# Patient Record
Sex: MALE | Race: WHITE | Employment: FULL TIME | URBAN - METROPOLITAN AREA
[De-identification: names, ages, dates, MRNs, and addresses within clinical notes are randomized per-mention and may not be internally consistent; named-entity substitution may affect disease eponyms.]

---

## 2022-11-08 ENCOUNTER — OFFICE VISIT (OUTPATIENT)
Dept: ALLERGY | Facility: OTHER | Age: 37
End: 2022-11-08
Payer: COMMERCIAL

## 2022-11-08 VITALS — HEART RATE: 59 BPM | WEIGHT: 215.61 LBS | OXYGEN SATURATION: 97 % | HEIGHT: 68 IN | BODY MASS INDEX: 32.68 KG/M2

## 2022-11-08 DIAGNOSIS — T78.3XXA ANGIOEDEMA, INITIAL ENCOUNTER: Primary | ICD-10-CM

## 2022-11-08 LAB
ALBUMIN SERPL-MCNC: 4 G/DL (ref 3.4–5)
ALP SERPL-CCNC: 55 U/L (ref 40–150)
ALT SERPL W P-5'-P-CCNC: 35 U/L (ref 0–70)
ANION GAP SERPL CALCULATED.3IONS-SCNC: 5 MMOL/L (ref 3–14)
AST SERPL W P-5'-P-CCNC: 25 U/L (ref 0–45)
BASOPHILS # BLD AUTO: 0 10E3/UL (ref 0–0.2)
BASOPHILS NFR BLD AUTO: 1 %
BILIRUB SERPL-MCNC: 0.4 MG/DL (ref 0.2–1.3)
BUN SERPL-MCNC: 15 MG/DL (ref 7–30)
CALCIUM SERPL-MCNC: 8.5 MG/DL (ref 8.5–10.1)
CHLORIDE BLD-SCNC: 105 MMOL/L (ref 94–109)
CO2 SERPL-SCNC: 27 MMOL/L (ref 20–32)
CREAT SERPL-MCNC: 0.74 MG/DL (ref 0.66–1.25)
EOSINOPHIL # BLD AUTO: 0.1 10E3/UL (ref 0–0.7)
EOSINOPHIL NFR BLD AUTO: 3 %
ERYTHROCYTE [DISTWIDTH] IN BLOOD BY AUTOMATED COUNT: 12.4 % (ref 10–15)
GFR SERPL CREATININE-BSD FRML MDRD: >90 ML/MIN/1.73M2
GLUCOSE BLD-MCNC: 95 MG/DL (ref 70–99)
HCT VFR BLD AUTO: 44.4 % (ref 40–53)
HGB BLD-MCNC: 15.1 G/DL (ref 13.3–17.7)
LYMPHOCYTES # BLD AUTO: 1.8 10E3/UL (ref 0.8–5.3)
LYMPHOCYTES NFR BLD AUTO: 37 %
Lab: NORMAL
MCH RBC QN AUTO: 31.5 PG (ref 26.5–33)
MCHC RBC AUTO-ENTMCNC: 34 G/DL (ref 31.5–36.5)
MCV RBC AUTO: 93 FL (ref 78–100)
MONOCYTES # BLD AUTO: 0.6 10E3/UL (ref 0–1.3)
MONOCYTES NFR BLD AUTO: 11 %
NEUTROPHILS # BLD AUTO: 2.4 10E3/UL (ref 1.6–8.3)
NEUTROPHILS NFR BLD AUTO: 48 %
PERFORMING LABORATORY: NORMAL
PLATELET # BLD AUTO: 240 10E3/UL (ref 150–450)
POTASSIUM BLD-SCNC: 3.9 MMOL/L (ref 3.4–5.3)
PROT SERPL-MCNC: 7.4 G/DL (ref 6.8–8.8)
RBC # BLD AUTO: 4.8 10E6/UL (ref 4.4–5.9)
SODIUM SERPL-SCNC: 137 MMOL/L (ref 133–144)
SPECIMEN STATUS: NORMAL
T4 FREE SERPL-MCNC: 0.89 NG/DL (ref 0.76–1.46)
TEST NAME: NORMAL
TSH SERPL DL<=0.005 MIU/L-ACNC: 2.44 MU/L (ref 0.4–4)
WBC # BLD AUTO: 4.9 10E3/UL (ref 4–11)

## 2022-11-08 PROCEDURE — 88185 FLOWCYTOMETRY/TC ADD-ON: CPT | Mod: 90 | Performed by: ALLERGY & IMMUNOLOGY

## 2022-11-08 PROCEDURE — 82785 ASSAY OF IGE: CPT | Performed by: ALLERGY & IMMUNOLOGY

## 2022-11-08 PROCEDURE — 86003 ALLG SPEC IGE CRUDE XTRC EA: CPT | Performed by: ALLERGY & IMMUNOLOGY

## 2022-11-08 PROCEDURE — 99204 OFFICE O/P NEW MOD 45 MIN: CPT | Performed by: ALLERGY & IMMUNOLOGY

## 2022-11-08 PROCEDURE — 86800 THYROGLOBULIN ANTIBODY: CPT | Performed by: ALLERGY & IMMUNOLOGY

## 2022-11-08 PROCEDURE — 80050 GENERAL HEALTH PANEL: CPT | Performed by: ALLERGY & IMMUNOLOGY

## 2022-11-08 PROCEDURE — 86161 COMPLEMENT/FUNCTION ACTIVITY: CPT | Mod: 90 | Performed by: ALLERGY & IMMUNOLOGY

## 2022-11-08 PROCEDURE — 84439 ASSAY OF FREE THYROXINE: CPT | Performed by: ALLERGY & IMMUNOLOGY

## 2022-11-08 PROCEDURE — 83516 IMMUNOASSAY NONANTIBODY: CPT | Mod: 90 | Performed by: ALLERGY & IMMUNOLOGY

## 2022-11-08 PROCEDURE — 88184 FLOWCYTOMETRY/ TC 1 MARKER: CPT | Mod: 90 | Performed by: ALLERGY & IMMUNOLOGY

## 2022-11-08 PROCEDURE — 99000 SPECIMEN HANDLING OFFICE-LAB: CPT | Performed by: ALLERGY & IMMUNOLOGY

## 2022-11-08 PROCEDURE — 86376 MICROSOMAL ANTIBODY EACH: CPT | Performed by: ALLERGY & IMMUNOLOGY

## 2022-11-08 PROCEDURE — 86160 COMPLEMENT ANTIGEN: CPT | Mod: 90 | Performed by: ALLERGY & IMMUNOLOGY

## 2022-11-08 PROCEDURE — 36415 COLL VENOUS BLD VENIPUNCTURE: CPT | Performed by: ALLERGY & IMMUNOLOGY

## 2022-11-08 PROCEDURE — 86003 ALLG SPEC IGE CRUDE XTRC EA: CPT | Mod: 90 | Performed by: ALLERGY & IMMUNOLOGY

## 2022-11-08 PROCEDURE — 83520 IMMUNOASSAY QUANT NOS NONAB: CPT | Performed by: ALLERGY & IMMUNOLOGY

## 2022-11-08 PROCEDURE — 86160 COMPLEMENT ANTIGEN: CPT | Performed by: ALLERGY & IMMUNOLOGY

## 2022-11-08 RX ORDER — EPINEPHRINE 0.3 MG/.3ML
0.3 INJECTION SUBCUTANEOUS PRN
Qty: 2 EACH | Refills: 3 | Status: SHIPPED | OUTPATIENT
Start: 2022-11-08

## 2022-11-08 RX ORDER — FEXOFENADINE HCL 180 MG/1
180 TABLET ORAL DAILY
COMMUNITY

## 2022-11-08 RX ORDER — FAMOTIDINE 20 MG/1
20 TABLET, FILM COATED ORAL 2 TIMES DAILY
Qty: 60 TABLET | Refills: 3 | Status: SHIPPED | OUTPATIENT
Start: 2022-11-08

## 2022-11-08 RX ORDER — DIPHENHYDRAMINE HCL 25 MG
25 TABLET ORAL EVERY 6 HOURS PRN
COMMUNITY

## 2022-11-08 ASSESSMENT — ENCOUNTER SYMPTOMS
VOMITING: 0
EYE REDNESS: 0
FATIGUE: 0
FACIAL SWELLING: 0
CHEST TIGHTNESS: 0
STRIDOR: 0
SINUS PRESSURE: 0
EYE ITCHING: 0
RHINORRHEA: 0
NAUSEA: 0
DIARRHEA: 0
WHEEZING: 0
EYE DISCHARGE: 0
FEVER: 0
SHORTNESS OF BREATH: 0
ACTIVITY CHANGE: 0

## 2022-11-08 NOTE — PATIENT INSTRUCTIONS
Get the bloodwork done.   Take fexofenadine 360 mg by mouth twice daily.   Take famotidine 20 mg by mouth twice daily.             Allergy Staff Appt Hours Shot Hours Locations    Physician     Martin Edwards MD       Support Staff     Alvina VALLECILLO, ANTON VALLECILLO CMA  Tuesday:   [place] :  [place]: :         [place]:  [place] [place]        Tuesday: : : : : [facility] [place]       Tues & Wed:  & Thurs:        Fri:          [facility]  [address] [place] [address]  Appt Line: [phone]    [facility]  [address] [place] [address]  Appt Line: [phone]    Pulmonary Function Scheduling:  [place]: [phone]  [place]: [phone]  [phone]     Prescription Assistance    If you need assistance with your prescriptions (cost, coverage, etc) please contact: [facility] [phone]    Important Scheduling Information    Appointments for skin testing: Appointment will last approximately 45 minutes.  Please call the appointment line for your clinic to schedule.  Discontinue oral antihistamines 7 days prior to the appointment.  Discontinue nasal and ocular antihistamines 4 days prior to appointment.    Appointments for challenges (oral food challenge, penicillin testing, aspirin desensitization) If your provider instructs you to that this additional testing is indicated, it will need to be scheduled directly through the allergy department.  Please contact them via [facility] or by calling the clinic and asking to speak with the allergy team.  They will provide additional information and instructions for the appointment.  Discontinue oral antihistamines 7 days prior to the appointment.  Discontinue nasal and ocular antihistamines 4 days prior to the  appointment.    Thank you for trusting us with your care. Please feel free to contact us with any questions or concerns you may have.

## 2022-11-08 NOTE — LETTER
11/8/2022         RE: Ede Shepherd  [address]  [place] [address]        Dear Colleague,    Thank you for referring your patient, Ede Shepherd, to the [facility]. Please see a copy of my visit note below.    SUBJECTIVE:                                                                   Ede Shepherd presents today to our Allergy Clinic at [facility] for a new patient visit. He is a 37 year old male with recurrent episodes of angioedema within the past several months.    Ede had 3 episodes of upper lip angioedema within the past 1.5-2 months.  1 time, when they were traveling to [place], he woke up with a lip swelling in the morning without eating anything.  On another occasion, it just happened.  The last episode was last Friday, on November 4, 2022.  During the first episode, he has yet to ingest any foods immediately.  Potentially, he took ibuprofen with the second episode.  On Friday, he had some pizza and beer approximately 3 hours prior.  He has red meat in his diet almost daily. He denies taking NSAIDs on Friday.  Typically, the swelling reduces within 24 to 36 hours. However, the last episode seems to last longer. While the significant swelling was resolved within 24 hours, mild swelling sensation has persisted for several days. It is nonpruritic. It is not associated with any rashes. Not associated with tongue swelling, throat closing sensation, vomiting or diarrhea.    He did not start any daily medications before the symptoms started. He denies being on ACE inhibitors in the past.  Currently, he takes fexofenadine 360 mg by mouth twice daily. He is a friend with Dr. Meraz, an allergist who used to work for [employer]. Dr. Meraz recommended he take this dose.  He reports having a sister with chronic hives.      There is no problem list on file for this patient.      History reviewed. No pertinent past medical history.   Problem (# of Occurrences)  Relation (Name,Age of Onset)    Asthma (1) Sister        History reviewed. No pertinent surgical history.  Social History     Socioeconomic History     Marital status: Single     Spouse name: None     Number of children: None     Years of education: None     Highest education level: None   Tobacco Use     Smoking status: Never     Smokeless tobacco: Never   Vaping Use     Vaping Use: Never used   Social History Narrative    ENVIRONMENTAL HISTORY: [place] The home is heated with a forced air and gas furnace. They do have central air conditioning. The patient's bedroom is furnished with carpeting in bedroom.  Pets inside the house include 1 dog(s). There is no history of cockroach or mice infestation. There is/are 0 smokers in the house.  The house does not have a damp basement.            Review of Systems   Constitutional: Negative for activity change, fatigue and fever.   HENT: Negative for congestion, ear pain, facial swelling, nosebleeds, postnasal drip, rhinorrhea, sinus pressure and sneezing.         Recurrent lip swelling   Eyes: Negative for discharge, redness and itching.   Respiratory: Negative for chest tightness, shortness of breath, wheezing and stridor.    Gastrointestinal: Negative for diarrhea, nausea and vomiting.   Skin: Negative for rash.   Allergic/Immunologic: Negative for environmental allergies and food allergies.           Current Outpatient Medications:      diphenhydrAMINE (BENADRYL) 25 MG tablet, Take 25 mg by mouth every 6 hours as needed for itching or allergies, Disp: , Rfl:      EPINEPHrine (ANY BX GENERIC EQUIV) 0.3 MG/0.3ML injection 2-pack, Inject 0.3 mLs (0.3 mg) into the muscle as needed for anaphylaxis May repeat one time in 5-15 minutes if response to initial dose is inadequate., Disp: 2 each, Rfl: 3     famotidine (PEPCID) 20 MG tablet, Take 1 tablet (20 mg) by mouth 2 times daily, Disp: 60 tablet, Rfl: 3     fexofenadine (ALLEGRA)  "180 MG tablet, Take 180 mg by mouth daily, Disp: , Rfl:   Immunization History   Administered Date(s) Administered     COVID-19,PF,Moderna 12/23/2021     COVID-19,PF,Pfizer (12+ Yrs) 04/24/2021, 05/15/2021     FLU 6-35 months 12/01/2005     Hep B, Peds or Adolescent 06/27/1995, 08/02/1995, 01/02/1996     Historical DTP/aP 1983, 1983, 01/03/1984, 05/30/1985, 08/08/1988     Influenza (IIV3) PF 12/10/2003     Influenza Vaccine IM > 6 months Valent IIV4 (Alfuria,Fluzone) 09/15/2016, 03/16/2018, 12/26/2018     MMR 10/17/1984, 06/27/1995     Meningococcal (Menomune ) 04/16/2003     OPV, trivalent, live 1983, 1983, 05/30/1985, 08/08/1988     Td (Adult), Adsorbed 08/26/1997     Tdap (Adacel,Boostrix) 05/20/2016     Varicella 08/01/2016, 09/15/2016     No Known Allergies  OBJECTIVE:                                                                 Pulse 59   Ht 1.727 m (5' 8\")   Wt 97.8 kg (215 lb 9.8 oz)   SpO2 97%   BMI 32.78 kg/m          Physical Exam  Vitals and nursing note reviewed.   Constitutional:       General: He is not in acute distress.     Appearance: He is not diaphoretic.   HENT:      Head: Normocephalic and atraumatic.      Comments: He reports that his upper lip is still slightly swollen.  If there is a subtle swelling, I do not see that.  However, I have never seen him before before the symptoms started.     Right Ear: Tympanic membrane, ear canal and external ear normal.      Left Ear: Tympanic membrane, ear canal and external ear normal.      Nose: No mucosal edema or rhinorrhea.      Right Turbinates: Not enlarged, swollen or pale.      Left Turbinates: Not enlarged, swollen or pale.      Mouth/Throat:      Lips: Pink.      Mouth: Mucous membranes are moist.      Pharynx: Oropharynx is clear. No pharyngeal swelling, oropharyngeal exudate or posterior oropharyngeal erythema.   Eyes:      General:         Right eye: No discharge.         Left eye: No discharge.      " Conjunctiva/sclera: Conjunctivae normal.   Cardiovascular:      Rate and Rhythm: Normal rate and regular rhythm.      Heart sounds: Normal heart sounds. No murmur heard.  Pulmonary:      Effort: Pulmonary effort is normal. No respiratory distress.      Breath sounds: Normal breath sounds and air entry. No stridor, decreased air movement or transmitted upper airway sounds. No decreased breath sounds, wheezing, rhonchi or rales.   Musculoskeletal:         General: Normal range of motion.      Cervical back: Normal range of motion.   Skin:     General: Skin is warm.   Neurological:      Mental Status: He is alert and oriented to person, place, and time.   Psychiatric:         Mood and Affect: Mood normal.         Behavior: Behavior normal.         ASSESSMENT/PLAN:    Angioedema, initial encounter      Histamine versus bradykinin-induced.  Considering the duration of an acute swelling, both could participate in pathophysiology.  He feels that his upper lip is somewhat swollen today, but I cannot see that.  He denies a family history of hereditary angioedema.  Sister has recurrent urticaria.  There is a subgroup of patients that have isolated angioedema only and is a part chronic urticaria phenotype.  - I will order CBC with differential, CMP, and thyroid studies as a screening tool.  - Ordered serum tryptase level, C1 esterase inhibitor functional and protein level, complement C1q, complement C1q antibody IgG, and complement C4, to screen for systemic mastocytosis, hereditary, and acquired angioedema.  - Ordered serum IgE for omega 5 gliadin and alpha-gal.  Wonder if alcohol or NSAIDs could work as cofactors, although he had at least 1 episode without eating anything and waking up with lip swelling in the morning.  - Continue fexofenadine nightly to 60 mg by mouth twice daily.  - Start famotidine 20 mg by mouth twice daily.  Prescribed epinephrine autoinjector.    - CBC with Platelets & Differential  - IgE  -  Tryptase  - C1 esterase inhibitor functional  - C1 Esterase inhibitor total  - C1Q Antibody IgG  - Complement C4  - Complement component 1Q  -  allergen omega 5 gliadin IgE  - Galactose alpha 1 3 Galactose IgE  - Comprehensive metabolic panel  - Anti thyroglobulin antibody  - Thyroid peroxidase antibody  - TSH  - T4 free  - famotidine (PEPCID) 20 MG tablet  Dispense: 60 tablet; Refill: 3  - Urticaria Induced Basophil Activation  - EPINEPHrine (ANY BX GENERIC EQUIV) 0.3 MG/0.3ML injection 2-pack  Dispense: 2 each; Refill: 3       Return in about 6 weeks (around 12/20/2022), or if symptoms worsen or fail to improve, for eczema follow up.    Thank you for allowing us to participate in the care of this patient. Please feel free to contact us if there are any questions or concerns about the patient.    Disclaimer: This note consists of symbols derived from keyboarding, dictation and/or voice recognition software. As a result, there may be errors in the script that have gone undetected. Please consider this when interpreting information found in this chart.    Martin Edwards MD, FAAAAI, FACAAI  Allergy, Asthma and Immunology     [facility]       Again, thank you for allowing me to participate in the care of your patient.        Sincerely,        Martin Edwards MD

## 2022-11-09 LAB
C4 SERPL-MCNC: 32 MG/DL (ref 13–39)
IGE SERPL-ACNC: 180 KU/L (ref 0–114)
THYROGLOB AB SERPL IA-ACNC: <20 IU/ML
THYROPEROXIDASE AB SERPL-ACNC: <10 IU/ML

## 2022-11-10 LAB
ALPHA-GAL IGE QN: <0.1 KU/L
C1INH SERPL-MCNC: 33 MG/DL
DEPRECATED MISC ALLERGEN IGE RAST QL: NORMAL
MISCELLANEOUS TEST 1 (ARUP): NORMAL

## 2022-11-11 LAB
C1INH ACT/NOR SERPL: 83 %
TRYPTASE SERPL-MCNC: 5.1 UG/L

## 2022-11-14 LAB — C1Q AB SER-ACNC: 6 UNITS

## 2022-11-15 LAB — URTICARIA INDUCED BASOPHIL ACTIVATION: 0 %

## 2022-11-17 DIAGNOSIS — T78.3XXA ANGIOEDEMA, INITIAL ENCOUNTER: Primary | ICD-10-CM

## 2022-11-17 LAB — C1Q SERPL-MCNC: 143 UG/ML

## 2022-11-17 NOTE — RESULT ENCOUNTER NOTE
PLEASE CALL THE PATIENT    CBC with differential is within normal limits.  Comprehensive metabolic panel is within normal limits.  Thyroid studies including thyroid antibody (thyroglobulin and thyroid peroxidase antibody)levels are within normal limits.  Serum tryptase level is within normal limits.  C1 esterase inhibitor functional, C1 esterase inhibitor protein level, and complement C4 are within normal limits.  Complement C1q and anti-C1q IgG are within normal limits.  Urticaria induced basophil activation test is within normal limits.    For some reason, I ordered omega 5 gliadin but the lab sent for including IgE which is different from gluten.     I will reorder it, and specify that they should not order gluten IgE.    I have placed orders for this to be done. He will need to schedule a lab appointment.